# Patient Record
(demographics unavailable — no encounter records)

---

## 2025-01-10 NOTE — PHYSICAL EXAM
[2+] : left 2+ [Ankle Swelling (On Exam)] : not present [Varicose Veins Of Lower Extremities] : not present [] : not present [de-identified] : A&Ox3, NAD [de-identified] : There is a hallux abducto valgus and bunion deformity seen on the right foot.  Hypermobile first ray noted at this time.  There is no crepitus upon range of motion. Localized redness and swelling is seen on the dorso-medial aspect of the first metatarsophalangeal joint of the right foot consistent with bursitis and capsulitis.  Flexible hammertoe contractures of the right 2nd through 5th digits with lateral deviation of the toes.  Radiograph reveals hallux abducted to valgus deformity of the right foot, as well as met adductus deformities of the forefoot.  Radiograph also shows subluxation of the second and third digits with lateral deviation at the metatarsophalangeal joint. [de-identified] : No open lesions, no erythema, no ecchymosis, no proximal streaking, no fluctuance, no signs of infection [de-identified] : Light touch sensation intact bilaterally [de-identified] : No Open wounds [FreeTextEntry1] : Right Foot Bunion  [de-identified] : No Dressing     Dorsalis Pedis: R Palpable, Regular, 2+ L Palpable, Regular, 2+ Posterior Tibialis: R Palpable, Regular, 2+ L Palpable, Regular, 2+ Extremity Color: Pigmented Extremity Temperature: Warm Capillary Refill: < 3 seconds bilaterally

## 2025-01-10 NOTE — VITALS
[Pain related to present condition?] : The patient's  pain is related to present condition. [Aching] : aching [] : Yes [de-identified] : 3/10 [FreeTextEntry3] : Right Foot [FreeTextEntry1] : Rest [FreeTextEntry2] : Walking [FreeTextEntry4] : Patient sitting with legs elevated during exam [FreeTextEntry5] : Initial Visit, medications reconciled

## 2025-01-10 NOTE — ASSESSMENT
[Verbal] : Verbal [Written] : Written [Demo] : Demo [Patient] : Patient [Good - alert, interested, motivated] : Good - alert, interested, motivated [Verbalizes knowledge/Understanding] : Verbalizes knowledge/understanding [Dressing changes] : dressing changes [Foot Care] : foot care [Skin Care] : skin care [Signs and symptoms of infection] : sign and symptoms of infection [Nutrition] : nutrition [How and When to Call] : how and when to call [Pain Management] : pain management [Patient responsibility to plan of care] : patient responsibility to plan of care [] : Yes [Stable] : stable [Ambulatory] : Ambulatory [Not Applicable - Long Term Care/Home Health Agency] : Long Term Care/Home Health Agency: Not Applicable [Other: ____] : [unfilled] [FreeTextEntry2] : Infection prevention wound care Maintain optimal Skin Integrity to high pressure areas. Compression therapy Nutrition and Wound Healing Elevation and low sodium compliance Pressure relief/Pressure redistribution    [FreeTextEntry3] : Initial Visit  [FreeTextEntry4] : Pt sent to [facility] to obtain an x-ray today, Dr Kim will call Pt to discuss results. DPM discussed surgical intervention with patient. She is not ready to schedule yet, she will return once she is ready.

## 2025-01-10 NOTE — PLAN
[FreeTextEntry1] : Patient examined and evaluated at this time. Patient and patient's  advised regarding treatment options including surgical intervention. Patient and patient's  advised regarding the approximate healing process.  And the need to remain nonweightbearing until bony consolidation, as well as need for physical therapy after healing to aid in increasing her strength, mobility, and stability.  Patient will likely benefit from a right foot Lapidus bunionectomy with possible Akin bunionectomy, as well as met adductus correction of the forefoot with second and third metatarsal phalangeal joint capsule release and possible second and third digit arthroplasties. All questions answered to satisfaction and patient verbalized understanding at this time. Spent 30 minutes for patient care and medical decision making.

## 2025-01-10 NOTE — HISTORY OF PRESENT ILLNESS
[FreeTextEntry1] : Patient seen complaining of painful right foot bunion and hammertoe deformities.  Patient relates that she has had the deformities for several years, now noticing worsening with pain while pointing at the first metatarsal head, as well as her right second and third digits.  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  Patient relates that she has tried various shoe gear with no alleviation of her symptoms.  No other complaints at this time.

## 2025-01-14 NOTE — HISTORY OF PRESENT ILLNESS
[7] : 7 [0] : 0 [Constant] : constant [Leisure] : leisure [Work] : work [Sleep] : sleep [Standing] : standing [Walking] : walking [de-identified] : 01/14/2025: Patient is here today for right foot pain. Patient has bunions and hammertoe w/ overlapping toes (3rd over 4th digit). H/O left foot surgery (bunionectomy/repair) w/ Dr. Carmen (2017). Denies n/t. 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 Using silicone cushions in her shoes due to friction, WB in slip-on sneakers.    [FreeTextEntry1] : L foot [FreeTextEntry6] : Rubbing/friction [FreeTextEntry9] : Cushions, inserts

## 2025-01-14 NOTE — DISCUSSION/SUMMARY
[Surgical risks reviewed] : Surgical risks reviewed [de-identified] : The risks, benefits, alternatives have been discussed.  The risks include but are not limited to infection, bleeding, injury to small nerves and blood vessels, pain, stiffness, progression, dvt, PE, amputation and death.

## 2025-01-14 NOTE — DATA REVIEWED
[Outside X-rays] : outside x-rays [Report was reviewed and noted in the chart] : The report was reviewed and noted in the chart [I reviewed the films/CD and additionally noted] : I reviewed the films/CD and additionally noted [FreeTextEntry1] : 1/12/25  hv 46, lateral subluc mtp 2/3/4 w/overlap

## 2025-01-14 NOTE — PHYSICAL EXAM
[Right] : right foot and ankle [2nd] : 2nd [3rd] : 3rd [4th] : 4th [2+] : dorsalis pedis pulse: 2+ [] : Sensation present to light touch in all distributions

## 2025-07-02 NOTE — PHYSICAL EXAM
[Midline] : trachea located in midline position [Normal] : no rashes [de-identified] : Bilateral cerumen impaction

## 2025-07-02 NOTE — HISTORY OF PRESENT ILLNESS
[de-identified] : Davida Snyder is a 64 yo female who presents for evaluation of tinnitus. She notes chronic bilateral nonpulsatile tinnitus for years. She notes possible gradual hearing loss over time. She denies otalgia, otorrhea, or vertigo. She denies history of recurrent ear infections. She denies recent fevers or chills. She notes that her grandson has hearing difficulty since birth. She denies significant occupational noise exposure or exposure to ototoxic medications. She denies recent head trauma.

## 2025-07-02 NOTE — DATA REVIEWED
[de-identified] : Audiogram 7/2/25: Type A tymps AU. Hearing wnl to mild/borderline wnl SNHL AD. Hearing wnl to mild SNHL AS.

## 2025-07-02 NOTE — ASSESSMENT
[FreeTextEntry1] : Davida Snyder presents for evaluation of chronic tinnitus and hearing loss. Bilateral cerumen impaction was removed. Audiogram was reviewed showing type A tymps AU, hearing wnl to mild/borderline wnl SNHL AD. hearing wnl to mild SNHL AS. Discussed use of masking noise for tinnitus.  - f/u in 1 year for audio. equal bilaterally